# Patient Record
Sex: FEMALE | URBAN - METROPOLITAN AREA
[De-identification: names, ages, dates, MRNs, and addresses within clinical notes are randomized per-mention and may not be internally consistent; named-entity substitution may affect disease eponyms.]

---

## 2017-04-06 ENCOUNTER — OFFICE VISIT (OUTPATIENT)
Dept: FAMILY MEDICINE CLINIC | Facility: CLINIC | Age: 22
End: 2017-04-06

## 2017-04-06 VITALS
OXYGEN SATURATION: 98 % | BODY MASS INDEX: 25.69 KG/M2 | HEART RATE: 104 BPM | HEIGHT: 63 IN | WEIGHT: 145 LBS | RESPIRATION RATE: 16 BRPM | TEMPERATURE: 99 F | DIASTOLIC BLOOD PRESSURE: 80 MMHG | SYSTOLIC BLOOD PRESSURE: 116 MMHG

## 2017-04-06 DIAGNOSIS — H60.503 ACUTE OTITIS EXTERNA OF BOTH EARS, UNSPECIFIED TYPE: ICD-10-CM

## 2017-04-06 DIAGNOSIS — H66.003 ACUTE SUPPURATIVE OTITIS MEDIA OF BOTH EARS WITHOUT SPONTANEOUS RUPTURE OF TYMPANIC MEMBRANES, RECURRENCE NOT SPECIFIED: Primary | ICD-10-CM

## 2017-04-06 PROCEDURE — 99213 OFFICE O/P EST LOW 20 MIN: CPT | Performed by: NURSE PRACTITIONER

## 2017-04-06 RX ORDER — AMOXICILLIN AND CLAVULANATE POTASSIUM 875; 125 MG/1; MG/1
1 TABLET, FILM COATED ORAL 2 TIMES DAILY
Qty: 14 TABLET | Refills: 0 | Status: SHIPPED | OUTPATIENT
Start: 2017-04-06 | End: 2017-04-13

## 2017-04-06 RX ORDER — OFLOXACIN 3 MG/ML
3 SOLUTION/ DROPS OPHTHALMIC 3 TIMES DAILY
Qty: 1 BOTTLE | Refills: 0 | Status: SHIPPED | OUTPATIENT
Start: 2017-04-06 | End: 2017-04-13

## 2017-04-06 NOTE — PATIENT INSTRUCTIONS
Middle Ear Infection (Adult)  You have an infection of the middle ear, the space behind the eardrum. This is also called acute otitis media (AOM). Sometimes it is caused by the common cold.  This is because congestion can block the internal passage (eusta External otitis (also called “swimmer’s ear”) is an infection in the ear canal. It is often caused by bacteria or fungus. It can occur a few days after water gets trapped in the ear canal (from swimming or bathing).  It can also occur after cleaning too hermelinda · Ear pain becomes worse or doesn’t improve after 3 days of treatment  · Redness or swelling of the outer ear occurs or gets worse  · Headache  · Painful or stiff neck  · Drowsiness or confusion  · Fever of 100.4ºF (38ºC) or higher, or as directed by your · unusual bleeding, bruising  · unusually weak or tired  · white patches or sores in the mouth or throat  Side effects that usually do not require medical attention (report to your doctor or health care professional if they continue or are bothersome):  · NOTE:This sheet is a summary. It may not cover all possible information. If you have questions about this medicine, talk to your doctor, pharmacist, or health care provider.  Copyright© 2016 [employer]        Ofloxacin ear solution  What is this medicine · rash  · worsening ear pain  Side effects that usually do not require medical attention (report to your doctor or health care professional if they continue or are bothersome):  · abnormal sensation in the ear  · bad taste in mouth  · unpleasant sensation NOTE:This sheet is a summary. It may not cover all possible information. If you have questions about this medicine, talk to your doctor, pharmacist, or health care provider.  Copyright© 2016 [employer]

## 2019-02-14 ENCOUNTER — TELEPHONE (OUTPATIENT)
Dept: HEMATOLOGY/ONCOLOGY | Facility: HOSPITAL | Age: 24
End: 2019-02-14

## 2019-08-20 ENCOUNTER — TELEPHONE (OUTPATIENT)
Dept: FAMILY MEDICINE CLINIC | Facility: CLINIC | Age: 24
End: 2019-08-20

## 2019-08-20 ENCOUNTER — OFFICE VISIT (OUTPATIENT)
Dept: FAMILY MEDICINE CLINIC | Facility: CLINIC | Age: 24
End: 2019-08-20
Payer: COMMERCIAL

## 2019-08-20 VITALS
HEART RATE: 81 BPM | TEMPERATURE: 98 F | DIASTOLIC BLOOD PRESSURE: 77 MMHG | HEIGHT: 63 IN | BODY MASS INDEX: 28.35 KG/M2 | SYSTOLIC BLOOD PRESSURE: 117 MMHG | WEIGHT: 160 LBS

## 2019-08-20 DIAGNOSIS — Z02.0 SCHOOL HEALTH EXAMINATION: Primary | ICD-10-CM

## 2019-08-20 PROCEDURE — 99212 OFFICE O/P EST SF 10 MIN: CPT | Performed by: FAMILY MEDICINE

## 2019-08-20 PROCEDURE — 99213 OFFICE O/P EST LOW 20 MIN: CPT | Performed by: FAMILY MEDICINE

## 2019-08-20 NOTE — PROGRESS NOTES
HPI:    Patient ID: Du Zepeda is a 22year old female. HPI  Patient presents with:  Complete Form: for college     Needs form completed for [facility].   lmp- 8/20/19  Sexually active, has never had a pap    Review of Systems   Constitutional: Neg pertinent surgical history.   Social History    Socioeconomic History      Marital status: Single      Spouse name: Not on file      Number of children: Not on file      Years of education: Not on file      Highest education level: Not on file    Occupation patient.     HPV Vaccines(1 - Female 3-dose series) due on 07/21/2011  Annual Physical due on 01/21/2017  Pap Smear,3 Years due on 07/21/2017  Influenza Vaccine(1) due on 09/01/2019  Annual Depression Screen due on 08/20/2020         Current Outpatient [facility] previous peds fax them too  Patient states she has no preventative insurance so only office visit covered      No orders of the defined types were placed in this encounter.       Meds This Visit:  Requested Prescriptions      No prescriptions requested or o

## 2019-08-20 NOTE — TELEPHONE ENCOUNTER
Immunization records received the patient dropped off. I do not see a tetanus booster. Please inform patient that she may get this done here or at an outside retail pharmacy and to provide us a date so I can update her form that she can .

## 2019-08-22 NOTE — TELEPHONE ENCOUNTER
I have calling pt a few times but does not answer or voicemail wanted to inform her of message below

## 2019-08-28 ENCOUNTER — TELEPHONE (OUTPATIENT)
Dept: FAMILY MEDICINE CLINIC | Facility: CLINIC | Age: 24
End: 2019-08-28

## 2019-08-28 NOTE — TELEPHONE ENCOUNTER
Patient dropped off proof of her vaccine that she had done so you can complete the form that is needed. Please contact patient once form is completed. Patient would also like the copy of the proof back.      Place in Physician mailbox

## 2022-12-16 ENCOUNTER — TELEPHONE (OUTPATIENT)
Dept: HEMATOLOGY/ONCOLOGY | Facility: HOSPITAL | Age: 27
End: 2022-12-16

## 2023-01-11 NOTE — PROGRESS NOTES
CHIEF COMPLAINT:   Patient presents with:  Ear Pain: Right X 1 day,  no fever      HPI:   Alee Oleary is a 22year old female who presents to clinic today with complaints of right ear pain with left ear starting. Has had for 1  days.  Pain is described as GENERAL: well developed, well nourished,in no apparent distress  SKIN: no rashes,no suspicious lesions  HEAD: atraumatic, normocephalic  EYES: conjunctiva clear, EOM intact  EARS: Bilateral tragus non tender with manipulation.   External auditory canals red Discussed OTC medication for symptom relief such as mucinex with decongestant. Discussed with patient antibiotic and birth control effects. Pt use barrier method while on antibiotic and for at least 7 days afterwards. Pt understanding.        Risk and b · Ear pain gets worse or does not improve after 3 days of treatment  · Unusual drowsiness or confusion  · Neck pain, stiff neck, or headache  · Fluid or blood draining from the ear canal  · Fever of 100.4°F (38°C) or as advised   · Seizure  Date Last Revie · Keep your ears dry. This helps lower the risk of infection. Dry your ears with a towel or hair dryer after getting wet. Also, use ear plugs when swimming. · Do not stick any objects in the ear to remove wax.   · If you feel water trapped in your ear, use Talk to your pediatrician regarding the use of this medicine in children. Special care may be needed. What side effects may I notice from receiving this medicine?   Side effects that you should report to your doctor or health care professional as soon as p Tell your doctor or health care professional if your symptoms do not improve. Do not treat diarrhea with over the counter products. Contact your doctor if you have diarrhea that lasts more than 2 days or if it is severe and watery.   If you have diabetes, For ear canal infections, gently pull the outer ear upward and backward to help the drops flow down into the ear canal. For middle ear infections, press the skin-covered cartilage in the front part of the ear 4 times in a pumping motion to allow the drops What should I tell my health care provider before I take this medicine?   They need to know if you have any of these conditions:  · difficulty hearing  · an unusual or allergic reaction to ofloxacin, quinolone antibiotics, other medicines, foods, dyes, or p Cyclophosphamide Pregnancy And Lactation Text: This medication is Pregnancy Category D and it isn't considered safe during pregnancy. This medication is excreted in breast milk.

## (undated) NOTE — MR AVS SNAPSHOT
[id number] [facility] [address]  [place] [id number] [phone]               Thank you for choosing us for your health care visit with ISAC Delacruz.   We are glad to serve you and happy to provide you with this summary of your vi · Finish all of the antibiotic medicine given, even though you may feel better after the first few days. · You may use over-the-counter medicine, such as acetaminophen or ibuprofen, to control pain and fever, unless something else was prescribed.  If you h · Do not try to clean the ear canal. This can push pus and bacteria deeper into the canal.  · Use prescribed ear drops as directed. These help reduce swelling and fight the infection.  If an ear wick was placed in the ear canal, apply drops right onto the e professional's instructions. Amoxicillin; Clavulanic Acid tablets  What is this medicine? AMOXICILLIN; CLAVULANIC ACID (a mox i KENYA in; PAULA cai AS id) is a penicillin antibiotic. It is used to treat certain kinds of bacterial infections.  It If you miss a dose, take it as soon as you can. If it is almost time for your next dose, take only that dose. Do not take double or extra doses. Where should I keep my medicine? Keep out of the reach of children.   Store at room temperature below 25 degre the bottle by holding it in the hand for 1 to 2 minutes. Gently clean any fluid that can be easily removed from the outer ear. Lie down on your side with the infected ear up.  Try not to touch the tip of the dropper to your ear, fingertips, or other surface If you miss a dose, use it as soon as you can. If it is almost time for your next dose, use only that dose. Do not use double or extra doses. Where should I keep my medicine? Keep out of the reach of children.   Store at room temperature between 15 and 25 Amoxicillin-Pot Clavulanate 875-125 MG Tabs   Take 1 tablet by mouth 2 (two) times daily. X 7 days   Commonly known as:  AUGMENTIN           fluconazole 150 MG Tabs   Take on tablet, then one week later, repeat.    Commonly known as:  DIFLUCAN           MI Start activities slowly and build up over time Do what you like   Get your heart pumping – brisk walking, biking, swimming Even 10 minute increments are effective and add up over the week   2 ½ hours per week – spread out over time Use a vero to keep you